# Patient Record
Sex: MALE | Race: WHITE | NOT HISPANIC OR LATINO | Employment: FULL TIME | URBAN - METROPOLITAN AREA
[De-identification: names, ages, dates, MRNs, and addresses within clinical notes are randomized per-mention and may not be internally consistent; named-entity substitution may affect disease eponyms.]

---

## 2021-04-12 ENCOUNTER — NON-PROVIDER VISIT (OUTPATIENT)
Dept: OCCUPATIONAL MEDICINE | Facility: CLINIC | Age: 26
End: 2021-04-12

## 2021-04-12 DIAGNOSIS — Z02.1 PRE-EMPLOYMENT DRUG SCREENING: ICD-10-CM

## 2021-04-12 LAB
AMP AMPHETAMINE: NORMAL
COC COCAINE: NORMAL
INT CON NEG: NORMAL
INT CON POS: NORMAL
MET METHAMPHETAMINES: NORMAL
OPI OPIATES: NORMAL
PCP PHENCYCLIDINE: NORMAL
POC DRUG COMMENT 753798-OCCUPATIONAL HEALTH: NEGATIVE
THC: NORMAL

## 2021-04-12 PROCEDURE — 80305 DRUG TEST PRSMV DIR OPT OBS: CPT | Performed by: NURSE PRACTITIONER

## 2021-08-22 ENCOUNTER — OFFICE VISIT (OUTPATIENT)
Dept: URGENT CARE | Facility: CLINIC | Age: 26
End: 2021-08-22
Payer: COMMERCIAL

## 2021-08-22 ENCOUNTER — APPOINTMENT (OUTPATIENT)
Dept: RADIOLOGY | Facility: IMAGING CENTER | Age: 26
End: 2021-08-22
Attending: NURSE PRACTITIONER
Payer: COMMERCIAL

## 2021-08-22 VITALS
RESPIRATION RATE: 20 BRPM | DIASTOLIC BLOOD PRESSURE: 60 MMHG | TEMPERATURE: 97.7 F | WEIGHT: 185 LBS | HEART RATE: 72 BPM | SYSTOLIC BLOOD PRESSURE: 124 MMHG | BODY MASS INDEX: 29.73 KG/M2 | HEIGHT: 66 IN | OXYGEN SATURATION: 98 %

## 2021-08-22 DIAGNOSIS — S60.10XA SUBUNGUAL HEMATOMA OF DIGIT OF HAND, INITIAL ENCOUNTER: ICD-10-CM

## 2021-08-22 DIAGNOSIS — S69.91XA INJURY OF FINGER OF RIGHT HAND, INITIAL ENCOUNTER: ICD-10-CM

## 2021-08-22 DIAGNOSIS — S62.639A CLOSED FRACTURE OF TUFT OF DISTAL PHALANX OF FINGER: ICD-10-CM

## 2021-08-22 PROCEDURE — 11740 EVACUATION SUBUNGUAL HMTMA: CPT | Mod: F6 | Performed by: NURSE PRACTITIONER

## 2021-08-22 PROCEDURE — 73140 X-RAY EXAM OF FINGER(S): CPT | Mod: TC,FY,RT | Performed by: NURSE PRACTITIONER

## 2021-08-22 PROCEDURE — 99203 OFFICE O/P NEW LOW 30 MIN: CPT | Mod: 25 | Performed by: NURSE PRACTITIONER

## 2021-08-22 RX ORDER — NAPROXEN SODIUM 220 MG
660 TABLET ORAL 2 TIMES DAILY WITH MEALS
COMMUNITY

## 2021-08-22 RX ORDER — DICLOFENAC SODIUM 75 MG/1
75 TABLET, DELAYED RELEASE ORAL 2 TIMES DAILY
Qty: 30 TABLET | Refills: 0 | Status: SHIPPED | OUTPATIENT
Start: 2021-08-22

## 2021-08-22 NOTE — PROGRESS NOTES
Subjective     Ajith David is a 26 y.o. male who presents with Finger Injury (x 2 days, Right index finger, able to move it, bruised )      Past Medical History:   Diagnosis Date   • Allergy    • Anxiety    • Depression    • Fracture     right 4th metacarpal    • Kidney stone      Social History     Socioeconomic History   • Marital status: Single     Spouse name: Not on file   • Number of children: Not on file   • Years of education: Not on file   • Highest education level: Not on file   Occupational History   • Not on file   Tobacco Use   • Smoking status: Never Smoker   • Smokeless tobacco: Never Used   Vaping Use   • Vaping Use: Never used   Substance and Sexual Activity   • Alcohol use: Yes     Comment: occ   • Drug use: No   • Sexual activity: Not on file   Other Topics Concern   • Not on file   Social History Narrative   • Not on file     Social Determinants of Health     Financial Resource Strain:    • Difficulty of Paying Living Expenses:    Food Insecurity:    • Worried About Running Out of Food in the Last Year:    • Ran Out of Food in the Last Year:    Transportation Needs:    • Lack of Transportation (Medical):    • Lack of Transportation (Non-Medical):    Physical Activity:    • Days of Exercise per Week:    • Minutes of Exercise per Session:    Stress:    • Feeling of Stress :    Social Connections:    • Frequency of Communication with Friends and Family:    • Frequency of Social Gatherings with Friends and Family:    • Attends Hindu[employer] Services:    • Active Member of Clubs or Organizations:    • Attends Club or Organization Meetings:    • Marital Status:    Intimate Partner Violence:    • Fear of Current or Ex-Partner:    • Emotionally Abused:    • Physically Abused:    • Sexually Abused:      Family History   Problem Relation Age of Onset   • Cancer Mother    • Hypertension Mother    • ADHD Father    • Allergies Father    • ADHD Brother    • Diabetes Sister        Allergies: Patient has no known  "allergies.    Patient is a 26-year-old male who presents today with complaint of pain to the distal aspect of the right index finger. Patient states 2 days ago he was working in his garage at home. He was opening a saw horse when the leg swung together and pinched his eating index finger in the hinge. He has been having pain to the distal aspect of the finger since. Reports paresthesia.        Other  This is a new problem. The current episode started in the past 7 days. The problem occurs constantly. The problem has been unchanged. Nothing aggravates the symptoms. He has tried nothing for the symptoms. The treatment provided no relief.       Review of Systems   Musculoskeletal:        Finger injury   All other systems reviewed and are negative.             Objective     /60 (BP Location: Left arm, Patient Position: Sitting, BP Cuff Size: Adult long)   Pulse 72   Temp 36.5 °C (97.7 °F) (Temporal)   Resp 20   Ht 1.676 m (5' 6\")   Wt 83.9 kg (185 lb)   SpO2 98%   BMI 29.86 kg/m²      Physical Exam  Vitals reviewed.   Constitutional:       Appearance: Normal appearance.   Skin:     General: Skin is warm and dry.   Neurological:      General: No focal deficit present.      Mental Status: He is alert and oriented to person, place, and time.   Psychiatric:         Mood and Affect: Mood normal.         Behavior: Behavior normal.       There is a subungual hematoma under the index finger on the right side. Patient has full range of motion from the PIP joint. He is unable to flex the DIP joint. There is point tenderness from the PIP joint down the distal aspect of the finger. There is discoloration to the distal aspect of the finger and soft tissue swelling.           XR finger:     8/22/2021 3:02 PM     HISTORY/REASON FOR EXAM:  Crushing injury second digit right hand.        TECHNIQUE/EXAM DESCRIPTION AND NUMBER OF VIEWS:   4 views of the RIGHT fingers.     COMPARISON: None     FINDINGS:  Bone mineralization is " normal.  Bilateral displaced acute fracture of the tuft of the distal phalanx of the second digit is identified.  There is no evidence of arthropathy.  There is mild soft tissue swelling.     IMPRESSION:     1.  Mildly displaced acute fracture tuft of distal phalanx second digit is identified.        Procedure:     Digital block was placed to the base of the right index finger with 1% lidocaine without epinephrine. 1.5 mL placed to the base of each side of the finger. Good anesthesia achieved. End of the finger was cleaned with Betadine and 3 small holes were drilled through the nail with an 18-gauge needle. Moderate amount of blood expectorated from under the nail. Splint placed.      Assessment & Plan   Finger injury of the right index finger  Tuft fracture  Subungual hematoma    Splint placed  Diclofenac  Elevate  Referral to orthopedics for follow up ; patient states he will follow up with [facility]        There are no diagnoses linked to this encounter.

## 2024-02-25 ENCOUNTER — APPOINTMENT (OUTPATIENT)
Dept: URGENT CARE | Facility: CLINIC | Age: 29
End: 2024-02-25